# Patient Record
Sex: MALE | Race: WHITE
[De-identification: names, ages, dates, MRNs, and addresses within clinical notes are randomized per-mention and may not be internally consistent; named-entity substitution may affect disease eponyms.]

---

## 2021-04-14 ENCOUNTER — HOSPITAL ENCOUNTER (EMERGENCY)
Dept: HOSPITAL 75 - ER | Age: 71
Discharge: HOME | End: 2021-04-14
Payer: MEDICARE

## 2021-04-14 VITALS — DIASTOLIC BLOOD PRESSURE: 78 MMHG | SYSTOLIC BLOOD PRESSURE: 125 MMHG

## 2021-04-14 VITALS — BODY MASS INDEX: 27.64 KG/M2 | HEIGHT: 66.02 IN | WEIGHT: 171.96 LBS

## 2021-04-14 DIAGNOSIS — I10: ICD-10-CM

## 2021-04-14 DIAGNOSIS — R04.0: Primary | ICD-10-CM

## 2021-04-14 LAB
APTT BLD: 47 SEC (ref 24–35)
BASOPHILS # BLD AUTO: 0 10^3/UL (ref 0–0.1)
BASOPHILS NFR BLD AUTO: 0 % (ref 0–10)
EOSINOPHIL # BLD AUTO: 0.1 10^3/UL (ref 0–0.3)
EOSINOPHIL NFR BLD AUTO: 1 % (ref 0–10)
HCT VFR BLD CALC: 40 % (ref 40–54)
HGB BLD-MCNC: 13.8 G/DL (ref 13.3–17.7)
INR PPP: 2.7 (ref 0.8–1.4)
LYMPHOCYTES # BLD AUTO: 1.1 10^3/UL (ref 1–4)
LYMPHOCYTES NFR BLD AUTO: 11 % (ref 12–44)
MANUAL DIFFERENTIAL PERFORMED BLD QL: NO
MCH RBC QN AUTO: 33 PG (ref 25–34)
MCHC RBC AUTO-ENTMCNC: 34 G/DL (ref 32–36)
MCV RBC AUTO: 96 FL (ref 80–99)
MONOCYTES # BLD AUTO: 0.8 10^3/UL (ref 0–1)
MONOCYTES NFR BLD AUTO: 9 % (ref 0–12)
NEUTROPHILS # BLD AUTO: 7.5 10^3/UL (ref 1.8–7.8)
NEUTROPHILS NFR BLD AUTO: 79 % (ref 42–75)
PLATELET # BLD: 170 10^3/UL (ref 130–400)
PMV BLD AUTO: 11.1 FL (ref 9–12.2)
PROTHROMBIN TIME: 28.9 SEC (ref 12.2–14.7)
WBC # BLD AUTO: 9.5 10^3/UL (ref 4.3–11)

## 2021-04-14 PROCEDURE — 85610 PROTHROMBIN TIME: CPT

## 2021-04-14 PROCEDURE — 36415 COLL VENOUS BLD VENIPUNCTURE: CPT

## 2021-04-14 PROCEDURE — 85730 THROMBOPLASTIN TIME PARTIAL: CPT

## 2021-04-14 PROCEDURE — 85025 COMPLETE CBC W/AUTO DIFF WBC: CPT

## 2021-04-14 NOTE — ED EENT
History of Present Illness


General


Chief Complaint:  Nasal Problems


Stated Complaint:  NOSE BLEED / ON WARFARIN





History of Present Illness


Date Seen by Provider:  Apr 14, 2021


Time Seen by Provider:  19:45


Initial Comments


71-year-old  male presents for epistaxis from right nare that has been 

occurring for the last 3 hours.  He is on Coumadin after having a valve 

replacement.  He has had problems with epistaxis in the past and is required 

cautery.  He tried Afrin and packing with no improvement in his symptoms.  His 

last INR was checked approximately 2 days ago with a home testing and it was 

3.1.  He is due to take Coumadin 5 mg this evening.


Timing/Duration:  this afternoon


Severity:  mild


Prearrival Treatment:  over the counter meds, squeezing nostrils, nasal packing


Associated Symptoms:  denies symptoms





Allergies and Home Medications


Allergies


Coded Allergies:  


     No Known Drug Allergies (Unverified , 2/18/16)





Home Medications


Unable to Obtain Active Prescriptions or Reported Meds





Patient Home Medication List


Home Medication List Reviewed:  Yes





Review of Systems


Review of Systems


Constitutional:  no symptoms reported


Nose:  see HPI, clots (Right), epistaxis (Right)


Mouth:  clots (From nasopharynx)





All Other Systems Reviewed


Negative Unless Noted:  Yes





Past Medical-Social-Family Hx


Past Med/Social Hx:  Reviewed Nursing Past Med/Soc Hx


Past Medical History


Orthopedic, Tonsillectomy, Valve Replacement


Pneumonia


High Cholesterol, Hypertension, Valvular Heart Disease


Reproductive Disorders:  No





Physical Exam


Vital Signs





Vital Signs - First Documented








 4/14/21





 19:45


 


Temp 36.3


 


Pulse 70


 


Resp 20


 


B/P (MAP) 177/111 (133)


 


O2 Delivery Room Air








Height, Weight, BMI


Height: 5'6"


Weight: 170lbs. oz. 77.598139ml; 27.44 BMI


Method:Stated


General Appearance:  WD/WN, no apparent distress


Ears:  bilateral ear auricle normal, bilateral ear canal normal, bilateral ear 

TM normal


Nose:  active bleeding, dried blood (Left nare)


Mouth/Throat:  other (Small clots but no active bleeding in the posterior 

pharynx)


Neck:  non-tender, full range of motion, supple, normal inspection


Cardiovascular:  normal peripheral pulses, regular rate, rhythm


Respiratory:  chest non-tender, lungs clear, normal breath sounds, no 

respiratory distress





Procedures/Interventions


Nasal :  


   Nasal Location:  Right


   Nasal Drops Instilled:  Afrin (patient did at home with no improvement)


   Inspection with:  Nasal Speculum


   Nasal Procedures:  Rapid Rhino (5.5)


Progress


Right nare: no active bleeding with otoscopic exam, large clot removed. packed 

with 2x2 and ice pack to nose for 5 min. 2x2 removed, trace blood present. Rapid

Rhino soaked in TXA to right nare and inflated with 2 ml air. Patient tolerated 

well. Ice pack to nose.





Progress/Results/Core Measures


Results/Orders


Lab Results





Laboratory Tests








Test


 4/14/21


20:15 Range/Units


 


 


White Blood Count


 9.5 


 4.3-11.0


10^3/uL


 


Red Blood Count


 4.20 L


 4.30-5.52


10^6/uL


 


Hemoglobin 13.8  13.3-17.7  g/dL


 


Hematocrit 40  40-54  %


 


Mean Corpuscular Volume 96  80-99  fL


 


Mean Corpuscular Hemoglobin 33  25-34  pg


 


Mean Corpuscular Hemoglobin


Concent 34 


 32-36  g/dL





 


Red Cell Distribution Width 14.7 H 10.0-14.5  %


 


Platelet Count


 170 


 130-400


10^3/uL


 


Mean Platelet Volume 11.1  9.0-12.2  fL


 


Immature Granulocyte % (Auto) 0   %


 


Neutrophils (%) (Auto) 79 H 42-75  %


 


Lymphocytes (%) (Auto) 11 L 12-44  %


 


Monocytes (%) (Auto) 9  0-12  %


 


Eosinophils (%) (Auto) 1  0-10  %


 


Basophils (%) (Auto) 0  0-10  %


 


Neutrophils # (Auto)


 7.5 


 1.8-7.8


10^3/uL


 


Lymphocytes # (Auto)


 1.1 


 1.0-4.0


10^3/uL


 


Monocytes # (Auto)


 0.8 


 0.0-1.0


10^3/uL


 


Eosinophils # (Auto)


 0.1 


 0.0-0.3


10^3/uL


 


Basophils # (Auto)


 0.0 


 0.0-0.1


10^3/uL


 


Immature Granulocyte # (Auto)


 0.0 


 0.0-0.1


10^3/uL


 


Prothrombin Time 28.9 H 12.2-14.7  SEC


 


INR Comment 2.7 H 0.8-1.4  


 


Activated Partial


Thromboplast Time 47 H


 24-35  SEC











My Orders





Orders - BILLY BENDER


Tranexamic Acid Injection (Cyklokapron I (4/14/21 19:46)


Cbc With Automated Diff (4/14/21 20:05)


Protime With Inr (4/14/21 20:05)


Partial Thromboplastin Time (4/14/21 20:05)


Carvedilol  Tablet (Coreg  Tablet) (4/14/21 20:15)





Medications Given in ED





Current Medications








 Medications  Dose


 Ordered  Sig/Lorna


 Route  Start Time


 Stop Time Status Last Admin


Dose Admin


 


 Carvedilol  12.5 mg  ONCE  ONCE


 PO  4/14/21 20:15


 4/14/21 20:16 DC 4/14/21 20:25


6.25 MG


 


 Tranexamic Acid  1,000 mg  STK-MED ONCE


 .ROUTE  4/14/21 19:46


 4/14/21 19:53 DC 4/14/21 19:54


1,000 MG








Vital Signs/I&O











 4/14/21





 19:45


 


Temp 36.3


 


Pulse 70


 


Resp 20


 


B/P (MAP) 177/111 (133)


 


O2 Delivery Room Air











Progress


Progress Note :  


   Time:  19:45


Progress Note


Patient seen and evaluated.


1959 rapid Rhino inserted.


2015 no further bleeding from the right nare or in the posterior pharynx. 

Patient has tolerated well and ice pack is in place. Awaiting lab results. Coreg

6.25 mg po for B/P


2030 INR therapeutic. 


2045 rapid rhino removed, no blood or clots present. No bleeding from right 

nare, patient has no complaints. B/P 125/80


2055 no further epistaxis, 2x2 in place and ice pack given.  Discharge 

instructions and return precautions reviewed with the patient.





Departure


Impression





   Primary Impression:  


   Epistaxis


Disposition:  01 HOME, SELF-CARE


Condition:  Improved





Departure-Patient Inst.


Decision time for Depature:  20:45


Referrals:  


NO,LOCAL PHYSICIAN (PCP/Family)


Primary Care Physician


Patient Instructions:  Nosebleeds (DC)





Add. Discharge Instructions:  


Use Afrin, apply packing, gentle pressure to nose, and ice pack to nose for 

nosebleed.


Follow-up with your primary care provider if symptoms are not improving or 

worsen.


Return to the emergency department for persistent bleeding, or new urgent 

healthcare concerns.





All discharge instructions reviewed with patient and/or family. Voiced 

understanding.


Scripts


Unable to Obtain Active Prescriptions or Reported Meds











BILLY BENDER                  Apr 14, 2021 20:13

## 2023-10-03 ENCOUNTER — HOSPITAL ENCOUNTER (EMERGENCY)
Dept: HOSPITAL 75 - ER | Age: 73
Discharge: HOME | End: 2023-10-03
Payer: MEDICARE

## 2023-10-03 VITALS — SYSTOLIC BLOOD PRESSURE: 169 MMHG | DIASTOLIC BLOOD PRESSURE: 88 MMHG

## 2023-10-03 VITALS — SYSTOLIC BLOOD PRESSURE: 154 MMHG | DIASTOLIC BLOOD PRESSURE: 79 MMHG

## 2023-10-03 DIAGNOSIS — R04.0: Primary | ICD-10-CM

## 2023-10-03 DIAGNOSIS — Z95.2: ICD-10-CM

## 2023-10-03 DIAGNOSIS — Z79.01: ICD-10-CM

## 2023-10-03 LAB
ALBUMIN SERPL-MCNC: 4 GM/DL (ref 3.2–4.5)
ALP SERPL-CCNC: 68 U/L (ref 40–136)
ALT SERPL-CCNC: 20 U/L (ref 0–55)
APTT BLD: 45 SEC (ref 24–35)
BASOPHILS # BLD AUTO: 0.1 10^3/UL (ref 0–0.1)
BASOPHILS NFR BLD AUTO: 1 % (ref 0–10)
BILIRUB SERPL-MCNC: 1.2 MG/DL (ref 0.1–1)
BUN/CREAT SERPL: 26
CALCIUM SERPL-MCNC: 9.3 MG/DL (ref 8.5–10.1)
CHLORIDE SERPL-SCNC: 103 MMOL/L (ref 98–107)
CO2 SERPL-SCNC: 23 MMOL/L (ref 21–32)
CREAT SERPL-MCNC: 2.22 MG/DL (ref 0.6–1.3)
EOSINOPHIL # BLD AUTO: 0.5 10^3/UL (ref 0–0.3)
EOSINOPHIL NFR BLD AUTO: 6 % (ref 0–10)
GFR SERPLBLD BASED ON 1.73 SQ M-ARVRAT: 30 ML/MIN
GLUCOSE SERPL-MCNC: 111 MG/DL (ref 70–105)
HCT VFR BLD CALC: 35 % (ref 40–54)
HGB BLD-MCNC: 11.9 G/DL (ref 13.3–17.7)
INR PPP: 2.5 (ref 0.8–1.4)
LYMPHOCYTES # BLD AUTO: 1 10^3/UL (ref 1–4)
LYMPHOCYTES NFR BLD AUTO: 12 % (ref 12–44)
MANUAL DIFFERENTIAL PERFORMED BLD QL: NO
MCH RBC QN AUTO: 33 PG (ref 25–34)
MCHC RBC AUTO-ENTMCNC: 35 G/DL (ref 32–36)
MCV RBC AUTO: 96 FL (ref 80–99)
MONOCYTES # BLD AUTO: 0.8 10^3/UL (ref 0–1)
MONOCYTES NFR BLD AUTO: 9 % (ref 0–12)
NEUTROPHILS # BLD AUTO: 5.9 10^3/UL (ref 1.8–7.8)
NEUTROPHILS NFR BLD AUTO: 72 % (ref 42–75)
PLATELET # BLD: 191 10^3/UL (ref 130–400)
PMV BLD AUTO: 10.5 FL (ref 9–12.2)
POTASSIUM SERPL-SCNC: 3.7 MMOL/L (ref 3.6–5)
PROT SERPL-MCNC: 7 GM/DL (ref 6.4–8.2)
PROTHROMBIN TIME: 26.5 SEC (ref 12.2–14.7)
SODIUM SERPL-SCNC: 138 MMOL/L (ref 135–145)
WBC # BLD AUTO: 8.3 10^3/UL (ref 4.3–11)

## 2023-10-03 PROCEDURE — 85610 PROTHROMBIN TIME: CPT

## 2023-10-03 PROCEDURE — 99282 EMERGENCY DEPT VISIT SF MDM: CPT

## 2023-10-03 PROCEDURE — 85025 COMPLETE CBC W/AUTO DIFF WBC: CPT

## 2023-10-03 PROCEDURE — 36415 COLL VENOUS BLD VENIPUNCTURE: CPT

## 2023-10-03 PROCEDURE — 80053 COMPREHEN METABOLIC PANEL: CPT

## 2023-10-03 PROCEDURE — 85730 THROMBOPLASTIN TIME PARTIAL: CPT

## 2023-10-03 NOTE — ED EENT
History of Present Illness


General


Chief Complaint:  Nasal Problems


Stated Complaint:  NOSE BLEED


Nursing Triage Note:  


PT TO ROOM 5 W NOSE BLEED. PT HAS CALLED DR BAKERS OFFICE AND WAS TOLD TO COME 


TO ED





History of Present Illness


Date Seen by Provider:  Oct 3, 2023


Time Seen by Provider:  17:00


Initial Comments


Julian is a 72yo male on chronic anti-coagulation who presents to the ER with 

nosebleed. He has recently been under the care of Dr Uribe, ENT - as he has had 

nose bleed in the last 1-2 weeks, requiring cauterization in the office. He 

recently completed antibiotics after nasal packing. He states he has nose bleed 

now about 1.5h.  He and his wife contacted Dr Uribe PTA.  Patient has a home 

made cotton pack in his right nare at presentation. He has been spitting up 

clots of blood. Denies nausea. Is not lightheaded or dizzy.  Is anti-coagulated 

on coumadin with artifical heart valve.





AS I am talking to the patient and wife, Dr Uribe walks into the room.


Timing/Duration:  abrupt (3pm)


Severity:  moderate


Location:  nose


Prearrival Treatment:  squeezing nostrils, nasal packing (homemade)


Associated Symptoms:  denies symptoms





Allergies and Home Medications


Allergies


Coded Allergies:  


     No Known Drug Allergies (Unverified , 2/18/16)





Patient Home Medication List


Home Medication List Reviewed:  Yes


Cephalexin (Cephalexin) 250 Mg Capsule, 250 MG PO BID


   Prescribed by: JONH MANLEY on 9/24/23 1518





Review of Systems


Review of Systems


Constitutional:  see HPI


Nose:  clots, epistaxis


Throat:  other (clots)


Respiratory:  no symptoms reported


Cardiovascular:  no symptoms reported


Gastrointestinal:  no symptoms reported





Past Medical-Social-Family Hx


Patient Social History


Tobacco Use?:  No


Substance use?:  No


Alcohol Use?:  Yes


Alcohol type:  Beer


Alcohol Frequency:  Once in a while


Pt feels they are or have been:  No





Immunizations Up To Date


Influenza Vaccine Up-to-Date:  Yes; Up-to-Date


First/Initial COVID19 Vaccinat:  YES


Second COVID19 Vaccination Gregory:  YES





Past Medical History


Surgery/Hospitalization HX:  


mechanical heart valve


Surgeries:  Yes (AORTIC VALVE REPLACEMENT )


Orthopedic, Tonsillectomy, Valve Replacement


Respiratory:  Yes


Pneumonia


Cardiac:  Yes


High Cholesterol, Hypertension, Valvular Heart Disease


Neurological:  No


Reproductive Disorders:  No


Gastrointestinal:  No


Musculoskeletal:  No


Endocrine:  No


Cancer:  No


Psychosocial:  No


Integumentary:  No


Blood Disorders:  Yes (Anticoagulated on warfarin for stroke prophylaxis with 

mechanical valve)





Physical Exam


Vital Signs





Vital Signs - First Documented








 10/3/23





 17:00


 


Pulse 78


 


Resp 18


 


B/P (MAP) 169/88 (115)


 


Pulse Ox 98








Height, Weight, BMI


Height: 5'6"


Weight: 170lbs. oz. 77.007229gb; 27.00 BMI


Method:Stated


General Appearance:  WD/WN, no apparent distress


Eyes:  bilateral eye normal inspection, bilateral eye PERRL, bilateral eye EOMI


Nose:  active bleeding (right nare)


Mouth/Throat:  other (clots posterior pharynx)


Respiratory:  no respiratory distress, no accessory muscle use


Neurologic/Psychiatric:  alert, normal mood/affect


Skin:  normal color, warm/dry





Progress/Results/Core Measures


Results/Orders


My Orders





Vital Signs/I&O











Blood Pressure Mean:                    115











Progress


Progress Note :  


   Time:  17:20


Progress Note


Patient seen and evaluated by me. Evaluation today includes physical exam, which

is pertinent for elderly gentleman in NAD, slightly hypertensive.  active 

bleeding right nare with cotton pack in nare.  Large clots noted in posterior 

pharynx.  No other concerning physical exam findings.





ddx includes anterior v posterior bleed





Patient evaluated by me - and as I am examining - Dr Uribe walks in (ENT) who 

knows the patient well. We administered 2 squirts of afrin right nare and Dr Uribe assumed care - placing a surgical pack (patient's wishes). He will send 

the patient home and follow up with him in clinic. Dr Uribe gave the patient his

prescription and discharge instructions.





Departure


Impression





   Primary Impression:  


   Epistaxis


   Additional Impression:  


   Chronic anticoagulation


Disposition:  01 HOME, SELF-CARE


Condition:  Improved





Departure-Patient Inst.


Decision time for Depature:  17:44


Referrals:  


MARY URIBE MD





NO,LOCAL PHYSICIAN (PCP)


Primary Care Physician


Patient Instructions:  Nosebleeds ED





Add. Discharge Instructions:  


Please take your medications as directed by Dr Uribe and Follow Up with him as 

instructed.





If you have any further bleeding through the pack tonight please return to the 

Emergency Department for re-evaluation.





Continue your other daily medications.





Copy


Copies To 1:   MARY URIBE MD, KATHRYN M MD          Oct 3, 2023 17:44

## 2023-10-03 NOTE — ED EENT
History of Present Illness


General


Chief Complaint:  Nasal Problems


Stated Complaint:  NOSE BLEED


Nursing Triage Note:  


PT BACK TO ED AFTER HAVING NOSE PACKED APPROX 30MIN AGO


Source:  patient


Exam Limitations:  no limitations





History of Present Illness


Date Seen by Provider:  Oct 3, 2023


Time Seen by Provider:  18:20


Initial Comments


Here with report of repeat nosebleed after having packing done by Dr. Uribe 

approximately 30 minutes ago.  Patient is on warfarin for mechanical heart 

valve.  His INR was 3.3 last week.  He had packing done by Dr. Uribe in things 

and improved.  Apparently this was Merocel sponge.  He noted clear/pink drips 

and then that became bloody drips.  It seems to have worsened and so he 

returned.  I did discuss with Dr. Uribe and he states that he can take to the OR

but if the patient would like to try a larger packing we could do that.  I did 

discuss this with the patient and family.  Both were open to trying another 

packing and see if that worked then going to the OR.  Denies nausea, vomiting or

fever.  Denies injury.


Timing/Duration:  abrupt, this afternoon


Severity:  moderate


Location:  nose


Prearrival Treatment:  nasal packing


Associated Symptoms:  No fever





Allergies and Home Medications


Allergies


Coded Allergies:  


     No Known Drug Allergies (Unverified , 2/18/16)





Patient Home Medication List


Home Medication List Reviewed:  Yes


Cephalexin (Cephalexin) 250 Mg Capsule, 250 MG PO BID


   Prescribed by: JONH MANLEY on 9/24/23 1518





Review of Systems


Review of Systems


Constitutional:  see HPI; No chills, No fever


Nose:  see HPI, epistaxis, bloody discharge


Respiratory:  No cough


Cardiovascular:  see HPI, Hx of Intervention





Past Medical-Social-Family Hx


Patient Social History


Tobacco Use?:  No


Substance use?:  No


Alcohol Use?:  Yes


Alcohol type:  Beer


Alcohol Frequency:  Once in a while


Pt feels they are or have been:  No





Immunizations Up To Date


First/Initial COVID19 Vaccinat:  YES


Second COVID19 Vaccination Gregory:  YES


Third COVID19 Vaccination Date:  YES





Past Medical History


Surgery/Hospitalization HX:  


mechanical heart valve


Surgeries:  Yes (AORTIC VALVE REPLACEMENT )


Orthopedic, Tonsillectomy, Valve Replacement


Respiratory:  Yes


Pneumonia


Cardiac:  Yes


High Cholesterol, Hypertension, Valvular Heart Disease


Neurological:  No


Reproductive Disorders:  No


Gastrointestinal:  No


Musculoskeletal:  No


Endocrine:  No


Cancer:  No


Psychosocial:  No


Integumentary:  No


Blood Disorders:  Yes (Anticoagulated on warfarin for stroke prophylaxis with 

mechanical valve)





Family Medical History


Reviewed Nursing Family Hx





Physical Exam


Vital Signs





Vital Signs - First Documented








 10/3/23





 18:20


 


Temp 36.4


 


Pulse 78


 


Resp 16


 


B/P (MAP) 154/79 (104)


 


Pulse Ox 97








Height, Weight, BMI


Height: 5'6"


Weight: 170lbs. oz. 77.814948hj; 27.00 BMI


Method:Stated


General Appearance:  WD/WN, no apparent distress


Nose:  active bleeding (With packing in place.)


Mouth/Throat:  other (Small ooze of blood noted posterior pharynx)


Cardiovascular:  regular rate, rhythm, other (Heart valve click)


Respiratory:  lungs clear, normal breath sounds


Neurologic/Psychiatric:  alert, oriented x 3





Progress/Results/Core Measures


Results/Orders


Lab Results





Laboratory Tests








Test


 10/3/23


18:25 Range/Units


 


 


White Blood Count


 8.3 


 4.3-11.0


10^3/uL


 


Red Blood Count


 3.60 L


 4.30-5.52


10^6/uL


 


Hemoglobin 11.9 L 13.3-17.7  g/dL


 


Hematocrit 35 L 40-54  %


 


Mean Corpuscular Volume 96  80-99  fL


 


Mean Corpuscular Hemoglobin 33  25-34  pg


 


Mean Corpuscular Hemoglobin


Concent 35 


 32-36  g/dL





 


Red Cell Distribution Width 14.9 H 10.0-14.5  %


 


Platelet Count


 191 


 130-400


10^3/uL


 


Mean Platelet Volume 10.5  9.0-12.2  fL


 


Immature Granulocyte % (Auto) 0   %


 


Neutrophils (%) (Auto) 72  42-75  %


 


Lymphocytes (%) (Auto) 12  12-44  %


 


Monocytes (%) (Auto) 9  0-12  %


 


Eosinophils (%) (Auto) 6  0-10  %


 


Basophils (%) (Auto) 1  0-10  %


 


Neutrophils # (Auto)


 5.9 


 1.8-7.8


10^3/uL


 


Lymphocytes # (Auto)


 1.0 


 1.0-4.0


10^3/uL


 


Monocytes # (Auto)


 0.8 


 0.0-1.0


10^3/uL


 


Eosinophils # (Auto)


 0.5 H


 0.0-0.3


10^3/uL


 


Basophils # (Auto)


 0.1 


 0.0-0.1


10^3/uL


 


Immature Granulocyte # (Auto)


 0.0 


 0.0-0.1


10^3/uL


 


Prothrombin Time 26.5 H 12.2-14.7  SEC


 


INR Comment 2.5 H 0.8-1.4  


 


Activated Partial


Thromboplast Time 45 H


 24-35  SEC





 


Sodium Level 138  135-145  MMOL/L


 


Potassium Level 3.7  3.6-5.0  MMOL/L


 


Chloride Level 103    MMOL/L


 


Carbon Dioxide Level 23  21-32  MMOL/L


 


Anion Gap 12  5-14  MMOL/L


 


Blood Urea Nitrogen 57 H 7-18  MG/DL


 


Creatinine


 2.22 H


 0.60-1.30


MG/DL


 


Estimat Glomerular Filtration


Rate 30 


  





 


BUN/Creatinine Ratio 26   


 


Glucose Level 111 H   MG/DL


 


Calcium Level 9.3  8.5-10.1  MG/DL


 


Corrected Calcium 9.3  8.5-10.1  MG/DL


 


Total Bilirubin 1.2 H 0.1-1.0  MG/DL


 


Aspartate Amino Transf


(AST/SGOT) 22 


 5-34  U/L





 


Alanine Aminotransferase


(ALT/SGPT) 20 


 0-55  U/L





 


Alkaline Phosphatase 68    U/L


 


Total Protein 7.0  6.4-8.2  GM/DL


 


Albumin 4.0  3.2-4.5  GM/DL








My Orders





Orders - ALISIA ENGLAND MD


Cbc And Automated Diff (10/3/23 18:26)


Comprehensive Metabolic Panel (10/3/23 18:26)


Protime With Inr (10/3/23 18:26)


Partial Thromboplastin Time (10/3/23 18:26)


Ed Iv/Invasive Line Start (10/3/23 18:26)


Oxymetazoline 0.05% Nasal Spry (Oxymetaz (10/3/23 18:27)


Tranexamic Acid Injection (Tranexamic Ac (10/3/23 18:27)


Lidocaine 2% W/Epi 1:100,000 (Xylocaine/ (10/3/23 18:30)


Tranexamic Acid Injection (Tranexamic Ac (10/3/23 19:16)





Vital Signs/I&O











 10/3/23





 18:20


 


Temp 36.4


 


Pulse 78


 


Resp 16


 


B/P (MAP) 154/79 (104)


 


Pulse Ox 97














Blood Pressure Mean:                    104











Progress


Progress Note :  


Progress Note


Seen and evaluated.  I did discuss with the patient and family regarding 

discussion with Dr. Uribe.  Patient would like to avoid the OR if possible and 

was okay with trying to replace the packing.  Significant other agreed.  We will

go ahead and get IV and check CBC, CMP, coags and type packing replacement.  I 

did use topical Afrin, TXA and lidocaine with epinephrine soaked on rapid Rhino.

 I initially tried a 7.5 cm rapid Rhino to try to achieve posterior and anterior

control.  I was unable to place this as it was way too large.  I switched to 4.5

cm rapid Rhino and was able to place that completely and balloon was expanded to

level of comfort.  Bleeding seems to have stopped at this point but we will 

reevaluate in about 30 minutes.  1924: Bleeding has resolved.  He is not having 

any drip anterior or posterior.  CBC reviewed and grossly nonconcerning.  CMP 

reviewed and do note elevated serum creatinine but this is in his normal range. 

Coags are also within his normal range and he is therapeutic at 2.5 on his INR. 

I did discuss the case with Dr. Uribe, patient's primary ENT.  We reviewed 

current findings and therapy.  He will follow him in the office.  He wrote him a

prescription for cephalexin earlier and we will have the patient continue that. 

Patient will call office for further instructions.  Dr. Uribe would like them to

call if he is having any discomfort or other problems.  He does have appointment

on the 12th and he can go until then with the rapid Rhino in but can also 

arrange for earlier removal if needed.  Patient and family will call the office 

tomorrow.  I will send a copy of the chart to Dr. Uribe's office.  Discharged 

home with return precautions.  Patient verbalized understanding of instructions 

and agreement with plan.





Departure


Impression





   Primary Impression:  


   Epistaxis


Disposition:  01 HOME, SELF-CARE


Condition:  Improved





Departure-Patient Inst.


Decision time for Depature:  19:26


Referrals:  


MARY URIBE MD





NO,LOCAL PHYSICIAN (PCP)


Primary Care Physician


Patient Instructions:  Nosebleeds (DC)





Add. Discharge Instructions:  








All discharge instructions reviewed with patient and/or family. Voiced 

understanding.





Keep  nasal packing in until your appointment with Dr. Uribe.  This may be on 

the 12th or they may make that earlier.  It is okay for you to call the office 

to get further directions.  Take antibiotics as prescribed by Dr. Uribe.  Do not

pull the packing or blow your nose.  Return for bleeding, increased pain, fever,

breathing problems or other concerns as needed.





Copy


Copies To 1:   MARY URIBE MD, TIMOTHY D MD           Oct 3, 2023 18:56